# Patient Record
Sex: FEMALE | URBAN - METROPOLITAN AREA
[De-identification: names, ages, dates, MRNs, and addresses within clinical notes are randomized per-mention and may not be internally consistent; named-entity substitution may affect disease eponyms.]

---

## 2023-04-16 ENCOUNTER — HOSPITAL ENCOUNTER (OUTPATIENT)
Facility: HOSPITAL | Age: 38
Discharge: HOME | End: 2023-04-17
Attending: OBSTETRICS & GYNECOLOGY | Admitting: OBSTETRICS & GYNECOLOGY

## 2023-04-16 PROBLEM — Z33.1 IUP (INTRAUTERINE PREGNANCY), INCIDENTAL: Status: ACTIVE | Noted: 2023-04-16

## 2023-04-16 LAB
APPEARANCE UR: ABNORMAL
BILIRUB UR QL: NEGATIVE
COLOR UR: ABNORMAL
GLUCOSE UR QL STRIP.AUTO: NEGATIVE MG/DL
KETONES UR-MCNC: NEGATIVE MG/DL
LEUKOCYTE ESTERASE UR QL STRIP: ABNORMAL
NITRITE UR QL: POSITIVE
PH UR: 6.5 (ref 5–9)
PROT UR QL: 100 MG/DL
RBC # UR STRIP: ABNORMAL
SERVICE CMNT-IMP: ABNORMAL
SP GR UR: 1.01 (ref 1–1.02)
UROBILINOGEN UR QL: 0.2 EU/DL (ref 0.2–1)

## 2023-04-16 PROCEDURE — 81003 URINALYSIS AUTO W/O SCOPE: CPT

## 2023-04-17 VITALS — TEMPERATURE: 98.2 F | BODY MASS INDEX: 31.18 KG/M2 | HEIGHT: 63 IN | WEIGHT: 176 LBS

## 2023-04-17 PROBLEM — R31.9 URINARY TRACT INFECTION WITH HEMATURIA: Status: ACTIVE | Noted: 2023-04-17

## 2023-04-17 PROBLEM — N39.0 URINARY TRACT INFECTION WITH HEMATURIA: Status: ACTIVE | Noted: 2023-04-17

## 2023-04-17 LAB
APPEARANCE UR: ABNORMAL
BACTERIA URNS QL MICRO: ABNORMAL /HPF
BILIRUB UR QL: NEGATIVE
COLOR UR: ABNORMAL
EPITH CASTS URNS QL MICRO: ABNORMAL /LPF (ref 0–5)
GLUCOSE UR STRIP.AUTO-MCNC: NEGATIVE MG/DL
HGB UR QL STRIP: ABNORMAL
KETONES UR QL STRIP.AUTO: NEGATIVE MG/DL
LEUKOCYTE ESTERASE UR QL STRIP.AUTO: ABNORMAL
NITRITE UR QL STRIP.AUTO: POSITIVE
PH UR STRIP: 6.5 (ref 5–8)
PROT UR STRIP-MCNC: 100 MG/DL
RBC #/AREA URNS HPF: ABNORMAL /HPF (ref 0–5)
SERVICE CMNT-IMP: NORMAL
SP GR UR REFRACTOMETRY: 1 (ref 1–1.03)
UROBILINOGEN UR QL STRIP.AUTO: 1 EU/DL (ref 0.2–1)
WBC URNS QL MICRO: ABNORMAL /HPF (ref 0–5)
WET PREP GENITAL: NORMAL

## 2023-04-17 PROCEDURE — 87186 SC STD MICRODIL/AGAR DIL: CPT

## 2023-04-17 PROCEDURE — 87077 CULTURE AEROBIC IDENTIFY: CPT

## 2023-04-17 PROCEDURE — 59025 FETAL NON-STRESS TEST: CPT

## 2023-04-17 PROCEDURE — 81001 URINALYSIS AUTO W/SCOPE: CPT

## 2023-04-17 PROCEDURE — 87086 URINE CULTURE/COLONY COUNT: CPT

## 2023-04-17 PROCEDURE — 99285 EMERGENCY DEPT VISIT HI MDM: CPT

## 2023-04-17 PROCEDURE — 6370000000 HC RX 637 (ALT 250 FOR IP): Performed by: ADVANCED PRACTICE MIDWIFE

## 2023-04-17 PROCEDURE — 87210 SMEAR WET MOUNT SALINE/INK: CPT

## 2023-04-17 RX ORDER — NITROFURANTOIN 25; 75 MG/1; MG/1
100 CAPSULE ORAL EVERY 12 HOURS SCHEDULED
Qty: 13 CAPSULE | Refills: 0 | Status: SHIPPED | OUTPATIENT
Start: 2023-04-17 | End: 2023-04-24

## 2023-04-17 RX ORDER — NITROFURANTOIN 25; 75 MG/1; MG/1
100 CAPSULE ORAL EVERY 12 HOURS SCHEDULED
Qty: 13 CAPSULE | Refills: 0 | Status: CANCELLED | OUTPATIENT
Start: 2023-04-17 | End: 2023-04-24

## 2023-04-17 RX ORDER — NITROFURANTOIN 25; 75 MG/1; MG/1
100 CAPSULE ORAL EVERY 12 HOURS SCHEDULED
Status: DISCONTINUED | OUTPATIENT
Start: 2023-04-17 | End: 2023-04-17 | Stop reason: HOSPADM

## 2023-04-17 RX ADMIN — NITROFURANTOIN (MONOHYDRATE/MACROCRYSTALS) 100 MG: 75; 25 CAPSULE ORAL at 00:46

## 2023-04-17 NOTE — PROGRESS NOTES
2326-Pt arrived with complaints of UTI symptoms and also states she might have BV. Pt placed on the monitor. Pt is a   with all except first baby. 29 weeks. 0000-CNM at bedside wet prep cervix closed. 0034-Orders for macrobid. BV negative. 0045-Discharge instructions reviewed and macrobid given. 0049-This nurse walked patient down to exit.

## 2023-04-17 NOTE — PROGRESS NOTES
Triage Progress Note      Patient presented to triage at 29+0 with c/o urinary frequency and pain with vaginal discharge. She reports the symptoms started today. Physical Exam:  Cervical Exam: Internal os closed, external os, 1 cm, thick, -3   Membranes:  intact  Uterine Activity: occasional ctx lasting 50-60 seconds  Fetal Heart Rate: 135 bpm  Variability: moderate  Accelerations: appropriate for gestational age  Decelerations: absent     Assessment/Plan:  Urinary tract infection   +nitrites and blood on UA   Urine culture sent   Macrobid 100mg PO BID x 7 days    Vaginal discharge   Wet prep collected- negative for clue cells, trich, and yeast    Reassuring fetal status, no s/s of labor. Plan d/c home with office f/u. Patient to  prescription at the pharmacy.     Malu Holman CNM

## 2023-04-19 LAB
BACTERIA SPEC CULT: ABNORMAL
CC UR VC: ABNORMAL
SERVICE CMNT-IMP: ABNORMAL

## 2023-05-24 ENCOUNTER — HOSPITAL ENCOUNTER (OUTPATIENT)
Facility: HOSPITAL | Age: 38
Setting detail: OBSERVATION
Discharge: HOME | End: 2023-05-24
Attending: OBSTETRICS & GYNECOLOGY | Admitting: OBSTETRICS & GYNECOLOGY
Payer: MEDICAID

## 2023-05-24 VITALS
WEIGHT: 172 LBS | SYSTOLIC BLOOD PRESSURE: 114 MMHG | RESPIRATION RATE: 17 BRPM | BODY MASS INDEX: 30.48 KG/M2 | OXYGEN SATURATION: 99 % | HEART RATE: 100 BPM | HEIGHT: 63 IN | TEMPERATURE: 98.2 F | DIASTOLIC BLOOD PRESSURE: 68 MMHG

## 2023-05-24 PROBLEM — O26.893 VAGINAL DISCHARGE DURING PREGNANCY IN THIRD TRIMESTER: Status: ACTIVE | Noted: 2023-05-24

## 2023-05-24 PROBLEM — N89.8 VAGINAL DISCHARGE DURING PREGNANCY IN THIRD TRIMESTER: Status: ACTIVE | Noted: 2023-05-24

## 2023-05-24 LAB
AMNIOTEST, POC: NORMAL
Lab: NORMAL
NEGATIVE QC PASS/FAIL: NORMAL
POSITIVE QC PASS/FAIL: NORMAL

## 2023-05-24 PROCEDURE — G0378 HOSPITAL OBSERVATION PER HR: HCPCS

## 2023-05-24 PROCEDURE — 59025 FETAL NON-STRESS TEST: CPT

## 2023-05-24 PROCEDURE — 99283 EMERGENCY DEPT VISIT LOW MDM: CPT

## 2023-05-24 RX ORDER — VALACYCLOVIR HYDROCHLORIDE 500 MG/1
500 TABLET, FILM COATED ORAL DAILY
COMMUNITY

## 2023-05-24 NOTE — PROGRESS NOTES
Discharge instructions gone over with patient, all questions and concerns addressed prior to discharge. Pt ambulated off unit.

## 2023-05-24 NOTE — PROGRESS NOTES
Pt arrived to  with complaints of LOF since  night. Pt stated she felt a gush at night and hasn't had any since. CNM aware, orders placed.  34.3w    Amnisure negative, CNM aware and does not want to do a speculum exam. CNM reviewed EFM tracing and received discharge order.

## 2023-07-02 ENCOUNTER — HOSPITAL ENCOUNTER (INPATIENT)
Facility: HOSPITAL | Age: 38
LOS: 1 days | Discharge: HOME | DRG: 560 | End: 2023-07-03
Attending: OBSTETRICS & GYNECOLOGY | Admitting: OBSTETRICS & GYNECOLOGY
Payer: MEDICAID

## 2023-07-02 ENCOUNTER — ANESTHESIA EVENT (OUTPATIENT)
Facility: HOSPITAL | Age: 38
DRG: 560 | End: 2023-07-02
Payer: MEDICAID

## 2023-07-02 ENCOUNTER — ANESTHESIA (OUTPATIENT)
Facility: HOSPITAL | Age: 38
DRG: 560 | End: 2023-07-02
Payer: MEDICAID

## 2023-07-02 PROBLEM — Z37.9 NORMAL LABOR: Status: ACTIVE | Noted: 2023-07-02

## 2023-07-02 LAB
ABO + RH BLD: NORMAL
BASOPHILS # BLD: 0 K/UL (ref 0–0.1)
BASOPHILS NFR BLD: 0 % (ref 0–2)
BLOOD GROUP ANTIBODIES SERPL: NORMAL
DIFFERENTIAL METHOD BLD: ABNORMAL
EOSINOPHIL # BLD: 0.1 K/UL (ref 0–0.4)
EOSINOPHIL NFR BLD: 1 % (ref 0–5)
ERYTHROCYTE [DISTWIDTH] IN BLOOD BY AUTOMATED COUNT: 15.4 % (ref 11.6–14.5)
HCT VFR BLD AUTO: 33.9 % (ref 35–45)
HGB BLD-MCNC: 10.5 G/DL (ref 12–16)
IMM GRANULOCYTES # BLD AUTO: 0.1 K/UL (ref 0–0.04)
IMM GRANULOCYTES NFR BLD AUTO: 1 % (ref 0–0.5)
LYMPHOCYTES # BLD: 3.1 K/UL (ref 0.9–3.6)
LYMPHOCYTES NFR BLD: 27 % (ref 21–52)
MCH RBC QN AUTO: 26.5 PG (ref 24–34)
MCHC RBC AUTO-ENTMCNC: 31 G/DL (ref 31–37)
MCV RBC AUTO: 85.6 FL (ref 78–100)
MONOCYTES # BLD: 1.2 K/UL (ref 0.05–1.2)
MONOCYTES NFR BLD: 10 % (ref 3–10)
NEUTS SEG # BLD: 6.9 K/UL (ref 1.8–8)
NEUTS SEG NFR BLD: 61 % (ref 40–73)
NRBC # BLD: 0 K/UL (ref 0–0.01)
NRBC BLD-RTO: 0 PER 100 WBC
PLATELET # BLD AUTO: 311 K/UL (ref 135–420)
PMV BLD AUTO: 12 FL (ref 9.2–11.8)
RBC # BLD AUTO: 3.96 M/UL (ref 4.2–5.3)
SPECIMEN EXP DATE BLD: NORMAL
WBC # BLD AUTO: 11.3 K/UL (ref 4.6–13.2)

## 2023-07-02 PROCEDURE — 6370000000 HC RX 637 (ALT 250 FOR IP)

## 2023-07-02 PROCEDURE — 2500000003 HC RX 250 WO HCPCS: Performed by: NURSE ANESTHETIST, CERTIFIED REGISTERED

## 2023-07-02 PROCEDURE — 85025 COMPLETE CBC W/AUTO DIFF WBC: CPT

## 2023-07-02 PROCEDURE — 86900 BLOOD TYPING SEROLOGIC ABO: CPT

## 2023-07-02 PROCEDURE — 10907ZC DRAINAGE OF AMNIOTIC FLUID, THERAPEUTIC FROM PRODUCTS OF CONCEPTION, VIA NATURAL OR ARTIFICIAL OPENING: ICD-10-PCS | Performed by: OBSTETRICS & GYNECOLOGY

## 2023-07-02 PROCEDURE — 3700000025 EPIDURAL BLOCK: Performed by: ANESTHESIOLOGY

## 2023-07-02 PROCEDURE — 3700000156 HC EPIDURAL ANESTHESIA

## 2023-07-02 PROCEDURE — 59025 FETAL NON-STRESS TEST: CPT

## 2023-07-02 PROCEDURE — 1100000000 HC RM PRIVATE

## 2023-07-02 PROCEDURE — 6360000002 HC RX W HCPCS: Performed by: NURSE ANESTHETIST, CERTIFIED REGISTERED

## 2023-07-02 PROCEDURE — 99282 EMERGENCY DEPT VISIT SF MDM: CPT

## 2023-07-02 PROCEDURE — 4A1HXCZ MONITORING OF PRODUCTS OF CONCEPTION, CARDIAC RATE, EXTERNAL APPROACH: ICD-10-PCS | Performed by: OBSTETRICS & GYNECOLOGY

## 2023-07-02 PROCEDURE — 00HU33Z INSERTION OF INFUSION DEVICE INTO SPINAL CANAL, PERCUTANEOUS APPROACH: ICD-10-PCS | Performed by: ANESTHESIOLOGY

## 2023-07-02 PROCEDURE — 2580000003 HC RX 258: Performed by: NURSE ANESTHETIST, CERTIFIED REGISTERED

## 2023-07-02 PROCEDURE — 7220000101 HC DELIVERY VAGINAL/SINGLE

## 2023-07-02 PROCEDURE — 86850 RBC ANTIBODY SCREEN: CPT

## 2023-07-02 PROCEDURE — 86901 BLOOD TYPING SEROLOGIC RH(D): CPT

## 2023-07-02 PROCEDURE — 7210000100 HC LABOR FEE PER 1 HR

## 2023-07-02 RX ORDER — SODIUM CHLORIDE, SODIUM LACTATE, POTASSIUM CHLORIDE, AND CALCIUM CHLORIDE .6; .31; .03; .02 G/100ML; G/100ML; G/100ML; G/100ML
1000 INJECTION, SOLUTION INTRAVENOUS PRN
Status: DISCONTINUED | OUTPATIENT
Start: 2023-07-02 | End: 2023-07-02 | Stop reason: HOSPADM

## 2023-07-02 RX ORDER — CARBOPROST TROMETHAMINE 250 UG/ML
250 INJECTION, SOLUTION INTRAMUSCULAR PRN
Status: DISCONTINUED | OUTPATIENT
Start: 2023-07-02 | End: 2023-07-02 | Stop reason: HOSPADM

## 2023-07-02 RX ORDER — CARBOPROST TROMETHAMINE 250 UG/ML
250 INJECTION, SOLUTION INTRAMUSCULAR PRN
Status: DISCONTINUED | OUTPATIENT
Start: 2023-07-02 | End: 2023-07-03 | Stop reason: HOSPADM

## 2023-07-02 RX ORDER — LIDOCAINE HYDROCHLORIDE AND EPINEPHRINE 20; 5 MG/ML; UG/ML
INJECTION, SOLUTION EPIDURAL; INFILTRATION; INTRACAUDAL; PERINEURAL PRN
Status: DISCONTINUED | OUTPATIENT
Start: 2023-07-02 | End: 2023-07-02 | Stop reason: SDUPTHER

## 2023-07-02 RX ORDER — ROPIVACAINE HYDROCHLORIDE 2 MG/ML
INJECTION, SOLUTION EPIDURAL; INFILTRATION; PERINEURAL PRN
Status: DISCONTINUED | OUTPATIENT
Start: 2023-07-02 | End: 2023-07-02 | Stop reason: SDUPTHER

## 2023-07-02 RX ORDER — SODIUM CHLORIDE 9 MG/ML
INJECTION, SOLUTION INTRAVENOUS PRN
Status: DISCONTINUED | OUTPATIENT
Start: 2023-07-02 | End: 2023-07-03 | Stop reason: HOSPADM

## 2023-07-02 RX ORDER — ACETAMINOPHEN 500 MG
1000 TABLET ORAL EVERY 8 HOURS PRN
Status: DISCONTINUED | OUTPATIENT
Start: 2023-07-02 | End: 2023-07-03 | Stop reason: HOSPADM

## 2023-07-02 RX ORDER — LIDOCAINE HYDROCHLORIDE AND EPINEPHRINE 20; 5 MG/ML; UG/ML
INJECTION, SOLUTION EPIDURAL; INFILTRATION; INTRACAUDAL; PERINEURAL PRN
Status: DISCONTINUED | OUTPATIENT
Start: 2023-07-02 | End: 2023-07-02

## 2023-07-02 RX ORDER — ACETAMINOPHEN 325 MG/1
650 TABLET ORAL EVERY 4 HOURS PRN
Status: DISCONTINUED | OUTPATIENT
Start: 2023-07-02 | End: 2023-07-02 | Stop reason: HOSPADM

## 2023-07-02 RX ORDER — METHYLERGONOVINE MALEATE 0.2 MG/ML
200 INJECTION INTRAVENOUS PRN
Status: DISCONTINUED | OUTPATIENT
Start: 2023-07-02 | End: 2023-07-02 | Stop reason: HOSPADM

## 2023-07-02 RX ORDER — MAGNESIUM CARB/ALUMINUM HYDROX 105-160MG
30 TABLET,CHEWABLE ORAL PRN
Status: DISCONTINUED | OUTPATIENT
Start: 2023-07-02 | End: 2023-07-03 | Stop reason: HOSPADM

## 2023-07-02 RX ORDER — LIDOCAINE HYDROCHLORIDE 10 MG/ML
30 INJECTION, SOLUTION EPIDURAL; INFILTRATION; INTRACAUDAL; PERINEURAL PRN
Status: DISCONTINUED | OUTPATIENT
Start: 2023-07-02 | End: 2023-07-03 | Stop reason: HOSPADM

## 2023-07-02 RX ORDER — TRANEXAMIC ACID 10 MG/ML
1000 INJECTION, SOLUTION INTRAVENOUS
Status: DISCONTINUED | OUTPATIENT
Start: 2023-07-02 | End: 2023-07-03 | Stop reason: HOSPADM

## 2023-07-02 RX ORDER — DOCUSATE SODIUM 100 MG/1
100 CAPSULE, LIQUID FILLED ORAL 2 TIMES DAILY PRN
Status: DISCONTINUED | OUTPATIENT
Start: 2023-07-02 | End: 2023-07-03 | Stop reason: HOSPADM

## 2023-07-02 RX ORDER — ONDANSETRON 2 MG/ML
4 INJECTION INTRAMUSCULAR; INTRAVENOUS EVERY 6 HOURS PRN
Status: DISCONTINUED | OUTPATIENT
Start: 2023-07-02 | End: 2023-07-03 | Stop reason: HOSPADM

## 2023-07-02 RX ORDER — IBUPROFEN 400 MG/1
800 TABLET ORAL EVERY 8 HOURS PRN
Status: DISCONTINUED | OUTPATIENT
Start: 2023-07-02 | End: 2023-07-03 | Stop reason: HOSPADM

## 2023-07-02 RX ORDER — MISOPROSTOL 200 UG/1
800 TABLET ORAL PRN
Status: DISCONTINUED | OUTPATIENT
Start: 2023-07-02 | End: 2023-07-02 | Stop reason: HOSPADM

## 2023-07-02 RX ORDER — SENNA AND DOCUSATE SODIUM 50; 8.6 MG/1; MG/1
1 TABLET, FILM COATED ORAL DAILY PRN
Status: DISCONTINUED | OUTPATIENT
Start: 2023-07-02 | End: 2023-07-03 | Stop reason: HOSPADM

## 2023-07-02 RX ORDER — ONDANSETRON 2 MG/ML
4 INJECTION INTRAMUSCULAR; INTRAVENOUS EVERY 6 HOURS PRN
Status: DISCONTINUED | OUTPATIENT
Start: 2023-07-02 | End: 2023-07-02 | Stop reason: HOSPADM

## 2023-07-02 RX ORDER — METHYLERGONOVINE MALEATE 0.2 MG/ML
200 INJECTION INTRAVENOUS PRN
Status: DISCONTINUED | OUTPATIENT
Start: 2023-07-02 | End: 2023-07-03 | Stop reason: HOSPADM

## 2023-07-02 RX ORDER — FERROUS SULFATE 325(65) MG
325 TABLET ORAL
Status: DISCONTINUED | OUTPATIENT
Start: 2023-07-03 | End: 2023-07-03 | Stop reason: HOSPADM

## 2023-07-02 RX ORDER — MISOPROSTOL 200 UG/1
600 TABLET ORAL PRN
Status: DISCONTINUED | OUTPATIENT
Start: 2023-07-02 | End: 2023-07-03 | Stop reason: HOSPADM

## 2023-07-02 RX ORDER — SODIUM CHLORIDE 0.9 % (FLUSH) 0.9 %
5-40 SYRINGE (ML) INJECTION EVERY 12 HOURS SCHEDULED
Status: DISCONTINUED | OUTPATIENT
Start: 2023-07-02 | End: 2023-07-03 | Stop reason: HOSPADM

## 2023-07-02 RX ORDER — NALOXONE HYDROCHLORIDE 0.4 MG/ML
INJECTION, SOLUTION INTRAMUSCULAR; INTRAVENOUS; SUBCUTANEOUS PRN
Status: DISCONTINUED | OUTPATIENT
Start: 2023-07-02 | End: 2023-07-02 | Stop reason: HOSPADM

## 2023-07-02 RX ORDER — NALBUPHINE HYDROCHLORIDE 10 MG/ML
10 INJECTION, SOLUTION INTRAMUSCULAR; INTRAVENOUS; SUBCUTANEOUS
Status: DISCONTINUED | OUTPATIENT
Start: 2023-07-02 | End: 2023-07-02 | Stop reason: HOSPADM

## 2023-07-02 RX ORDER — MISOPROSTOL 200 UG/1
800 TABLET ORAL PRN
Status: DISCONTINUED | OUTPATIENT
Start: 2023-07-02 | End: 2023-07-03 | Stop reason: HOSPADM

## 2023-07-02 RX ORDER — SIMETHICONE 80 MG
80 TABLET,CHEWABLE ORAL EVERY 6 HOURS PRN
Status: DISCONTINUED | OUTPATIENT
Start: 2023-07-02 | End: 2023-07-03 | Stop reason: HOSPADM

## 2023-07-02 RX ORDER — TRANEXAMIC ACID 10 MG/ML
1000 INJECTION, SOLUTION INTRAVENOUS
Status: DISCONTINUED | OUTPATIENT
Start: 2023-07-02 | End: 2023-07-02 | Stop reason: HOSPADM

## 2023-07-02 RX ORDER — HYDROMORPHONE HYDROCHLORIDE 1 MG/ML
1 INJECTION, SOLUTION INTRAMUSCULAR; INTRAVENOUS; SUBCUTANEOUS
Status: DISCONTINUED | OUTPATIENT
Start: 2023-07-02 | End: 2023-07-03 | Stop reason: HOSPADM

## 2023-07-02 RX ORDER — SODIUM CHLORIDE 0.9 % (FLUSH) 0.9 %
5-40 SYRINGE (ML) INJECTION PRN
Status: DISCONTINUED | OUTPATIENT
Start: 2023-07-02 | End: 2023-07-03 | Stop reason: HOSPADM

## 2023-07-02 RX ORDER — LANOLIN/MINERAL OIL
LOTION (ML) TOPICAL PRN
Status: DISCONTINUED | OUTPATIENT
Start: 2023-07-02 | End: 2023-07-03 | Stop reason: HOSPADM

## 2023-07-02 RX ORDER — SODIUM CHLORIDE, SODIUM LACTATE, POTASSIUM CHLORIDE, AND CALCIUM CHLORIDE .6; .31; .03; .02 G/100ML; G/100ML; G/100ML; G/100ML
500 INJECTION, SOLUTION INTRAVENOUS PRN
Status: DISCONTINUED | OUTPATIENT
Start: 2023-07-02 | End: 2023-07-02 | Stop reason: HOSPADM

## 2023-07-02 RX ORDER — SODIUM CHLORIDE, SODIUM LACTATE, POTASSIUM CHLORIDE, CALCIUM CHLORIDE 600; 310; 30; 20 MG/100ML; MG/100ML; MG/100ML; MG/100ML
INJECTION, SOLUTION INTRAVENOUS CONTINUOUS
Status: DISCONTINUED | OUTPATIENT
Start: 2023-07-02 | End: 2023-07-02 | Stop reason: HOSPADM

## 2023-07-02 RX ORDER — LIDOCAINE HYDROCHLORIDE 10 MG/ML
INJECTION, SOLUTION INFILTRATION; PERINEURAL PRN
Status: DISCONTINUED | OUTPATIENT
Start: 2023-07-02 | End: 2023-07-02 | Stop reason: SDUPTHER

## 2023-07-02 RX ADMIN — LIDOCAINE HYDROCHLORIDE 3 MG: 10 INJECTION, SOLUTION INFILTRATION; PERINEURAL at 06:49

## 2023-07-02 RX ADMIN — IBUPROFEN 800 MG: 400 TABLET, FILM COATED ORAL at 21:53

## 2023-07-02 RX ADMIN — ROPIVACAINE HYDROCHLORIDE 6 ML: 2 INJECTION, SOLUTION EPIDURAL; INFILTRATION at 06:59

## 2023-07-02 RX ADMIN — LIDOCAINE HYDROCHLORIDE,EPINEPHRINE BITARTRATE 6 ML: 20; .005 INJECTION, SOLUTION EPIDURAL; INFILTRATION; INTRACAUDAL; PERINEURAL at 06:53

## 2023-07-02 RX ADMIN — FENTANYL CITRATE 10 ML/HR: 0.05 INJECTION, SOLUTION INTRAMUSCULAR; INTRAVENOUS at 07:07

## 2023-07-02 ASSESSMENT — PAIN SCALES - GENERAL: PAINLEVEL_OUTOF10: 6

## 2023-07-02 ASSESSMENT — PAIN DESCRIPTION - DESCRIPTORS: DESCRIPTORS: CRAMPING

## 2023-07-02 ASSESSMENT — PAIN DESCRIPTION - LOCATION: LOCATION: ABDOMEN

## 2023-07-02 ASSESSMENT — PAIN - FUNCTIONAL ASSESSMENT: PAIN_FUNCTIONAL_ASSESSMENT: ACTIVITIES ARE NOT PREVENTED

## 2023-07-03 VITALS
OXYGEN SATURATION: 100 % | HEART RATE: 70 BPM | SYSTOLIC BLOOD PRESSURE: 115 MMHG | WEIGHT: 184 LBS | TEMPERATURE: 98.1 F | RESPIRATION RATE: 17 BRPM | HEIGHT: 63 IN | BODY MASS INDEX: 32.6 KG/M2 | DIASTOLIC BLOOD PRESSURE: 63 MMHG

## 2023-07-03 LAB
ERYTHROCYTE [DISTWIDTH] IN BLOOD BY AUTOMATED COUNT: 15.9 % (ref 11.6–14.5)
HCT VFR BLD AUTO: 30.2 % (ref 35–45)
HGB BLD-MCNC: 9.3 G/DL (ref 12–16)
MCH RBC QN AUTO: 27 PG (ref 24–34)
MCHC RBC AUTO-ENTMCNC: 30.8 G/DL (ref 31–37)
MCV RBC AUTO: 87.5 FL (ref 78–100)
NRBC # BLD: 0 K/UL (ref 0–0.01)
NRBC BLD-RTO: 0 PER 100 WBC
PLATELET # BLD AUTO: 284 K/UL (ref 135–420)
PMV BLD AUTO: 12.4 FL (ref 9.2–11.8)
RBC # BLD AUTO: 3.45 M/UL (ref 4.2–5.3)
WBC # BLD AUTO: 11.8 K/UL (ref 4.6–13.2)

## 2023-07-03 PROCEDURE — 6370000000 HC RX 637 (ALT 250 FOR IP)

## 2023-07-03 PROCEDURE — 85027 COMPLETE CBC AUTOMATED: CPT

## 2023-07-03 PROCEDURE — 36415 COLL VENOUS BLD VENIPUNCTURE: CPT

## 2023-07-03 RX ORDER — IBUPROFEN 800 MG/1
800 TABLET ORAL EVERY 8 HOURS PRN
Qty: 90 TABLET | Refills: 1 | Status: SHIPPED | OUTPATIENT
Start: 2023-07-03

## 2023-07-03 RX ADMIN — IBUPROFEN 800 MG: 400 TABLET, FILM COATED ORAL at 06:49

## 2023-07-03 RX ADMIN — FERROUS SULFATE TAB 325 MG (65 MG ELEMENTAL FE) 325 MG: 325 (65 FE) TAB at 08:39

## 2023-07-03 ASSESSMENT — PAIN DESCRIPTION - LOCATION: LOCATION: ABDOMEN

## 2023-07-03 ASSESSMENT — PAIN DESCRIPTION - DESCRIPTORS: DESCRIPTORS: CRAMPING;SORE

## 2023-07-03 ASSESSMENT — PAIN SCALES - GENERAL
PAINLEVEL_OUTOF10: 5
PAINLEVEL_OUTOF10: 4

## 2023-07-03 NOTE — DISCHARGE SUMMARY
Obstetrical Discharge Summary     Name: Fuad Boyce MRN: [id number]  SSN: [id number]    YOB: 1985  Age: 43 y.o. Sex: female      Admit Date: 2023    Discharge Date: 7/3/2023    Admitting Physician: Josias Rinaldi MD     Attending Physician:  Vanessa Rodriguez MD     Discharge Diagnoses:   Sylvien Gretchen Boy Bartholome Ward [[id number]]       Information    Head delivery date/time: 2023 09:49:00   Changing the 's delivery date/time could affect patient care.:      Delivery date/time:  23 0949   Delivery type: , Spontaneous    Details: Additional Diagnoses:   Patient Active Problem List   Diagnosis    IUP (intrauterine pregnancy), incidental    Urinary tract infection with hematuria    Vaginal discharge during pregnancy in third trimester     (spontaneous vaginal delivery)     No components found for: Radha Rodriguez, [facility], [facility]  Recent Labs     23  0635   HGB 9.3*       Hospital Course: Normal hospital course following the delivery. Disposition: home    Discharge Condition: Good    Patient Instructions:   Current Discharge Medication List        START taking these medications    Details   ibuprofen (ADVIL;MOTRIN) 800 MG tablet Take 1 tablet by mouth every 8 hours as needed for Pain  Qty: 90 tablet, Refills: 1           CONTINUE these medications which have NOT CHANGED    Details   valACYclovir (VALTREX) 500 MG tablet Take 1 tablet by mouth daily              Reference my discharge instructions.   Follow up with OB provider in 6 weeks      Signed By:  TOMMY Clarke CNM     July 3, 2023

## 2023-07-03 NOTE — DISCHARGE INSTRUCTIONS
serious risk to your health. Obesity, smoking, and sedentary lifestyle greatly increases your risk for illness    A healthy diet, regular physical exercise & weight monitoring are important for maintaining a healthy lifestyle    Recognize signs and symptoms of STROKE:    F-face looks uneven    A-arms unable to move or move unevenly    S-speech slurred or non-existent    T-time-call 911 as soon as signs and symptoms begin-DO NOT go       Back to bed or wait to see if you get better-TIME IS BRAIN. Patient armband removed and given to patient to take home.   Patient was informed of the privacy risks if armband lost or stolen

## 2023-07-03 NOTE — LACTATION NOTE
This note was copied from a baby's chart. 23 0945   Visit Information   Lactation Consult Visit Type IP Initial Consult   Visit Length 15 minutes   Referral Received From Referred by MD   Reason for Visit Education;Normal [place] Visit   Breast Feeding History/Assessment   Breastfeeding History Yes   Longest duration (#) 12   Longest Duration months   Complications No   Feeding Assessment: Maternal Factors   Maternal Response Change to bottle   Feeding Assessment: Infant Factors   Infant Supplementation Expressed Breast Milk; Formula     Mom educated on breastfeeding basics--hunger cues, feeding on demand, waking baby if baby sleeps too long between feeds, importance of skin to skin, positioning and latching, risk of pacifier use and supplemental feedings, and importance of rooming in--and use of log sheet. Mom also educated on benefits of breastfeeding for herself and baby. Mom verbalized understanding. No questions at this time. Mom feeding a bottle at this time. Mom stated \"I want to exclusively pump, not breastfeed\". Discussed pumping expectations and milk safety. Mom verbalized understanding. Will remain available.

## 2023-07-03 NOTE — PROGRESS NOTES
Post-Partum Day Number 1 Progress Note    Aleks Abler     Assessment:   Patient Active Problem List   Diagnosis    IUP (intrauterine pregnancy), incidental    Urinary tract infection with hematuria    Vaginal discharge during pregnancy in third trimester    Normal labor     Doing well, post partum day 1    Plan:  1. Continue routine postpartum and perineal care as well as maternal education. 2. Discharge home today  3. Follow up with OB provider in 6 weeks    Information for the patient's :  Ambar Arzola [[id number]]   [address] [facility],[address], Spontaneous  Patient doing well without significant complaint. Voiding without difficulty, normal lochia.     Current Facility-Administered Medications   Medication Dose Route Frequency Provider Last Rate Last Admin    oxytocin (PITOCIN) 30 units in 500 mL infusion  87.3 ulisses-units/min IntraVENous Continuous PRN Jennetta Route, APRN - CNM        And    oxytocin (PITOCIN) 10 unit bolus from the bag  10 Units IntraVENous PRN Jennetta Route, APRN - CNM        HYDROmorphone HCl PF (DILAUDID) injection 1 mg  1 mg IntraVENous Q3H PRN Jennetta Route, APRN - CNM        witch hazel-glycerin (TUCKS) pad   Topical PRN Jennetta Route, APRN - CNM        mineral oil liquid 30 mL  30 mL Topical PRN Jennetta Route, APRN - CNM        lidocaine PF 1 % injection 30 mL  30 mL Other PRN Jennetta Route, APRN - CNM        sodium chloride flush 0.9 % injection 5-40 mL  5-40 mL IntraVENous 2 times per day Karlis Bristle, APRN - CNM        sodium chloride flush 0.9 % injection 5-40 mL  5-40 mL IntraVENous PRN Karlis Bristle, APRN - CNM        0.9 % sodium chloride infusion   IntraVENous PRN Romeo [place] Coxson, APRN - CNM        carboprost (HEMABATE) injection 250 mcg  250 mcg IntraMUSCular PRN Romeo [place] Coxson, APRN - CNM        methylergonovine (METHERGINE) injection 200 mcg  200 mcg IntraMUSCular PRN Romeo [place] Coxson, APRN - CNM        miSOPROStol (CYTOTEC) tablet 800 mcg  800 mcg

## 2023-08-15 ENCOUNTER — HOSPITAL ENCOUNTER (EMERGENCY)
Facility: HOSPITAL | Age: 38
Discharge: HOME | End: 2023-08-16
Attending: EMERGENCY MEDICINE
Payer: MEDICAID

## 2023-08-15 VITALS
OXYGEN SATURATION: 98 % | HEART RATE: 70 BPM | BODY MASS INDEX: 28.7 KG/M2 | DIASTOLIC BLOOD PRESSURE: 81 MMHG | WEIGHT: 162 LBS | TEMPERATURE: 97.6 F | HEIGHT: 63 IN | SYSTOLIC BLOOD PRESSURE: 119 MMHG | RESPIRATION RATE: 16 BRPM

## 2023-08-15 DIAGNOSIS — H60.502 ACUTE OTITIS EXTERNA OF LEFT EAR, UNSPECIFIED TYPE: Primary | ICD-10-CM

## 2023-08-15 DIAGNOSIS — R42 DIZZINESS: ICD-10-CM

## 2023-08-15 DIAGNOSIS — H66.002 NON-RECURRENT ACUTE SUPPURATIVE OTITIS MEDIA OF LEFT EAR WITHOUT SPONTANEOUS RUPTURE OF TYMPANIC MEMBRANE: ICD-10-CM

## 2023-08-15 LAB
ALBUMIN SERPL-MCNC: 3.6 G/DL (ref 3.4–5)
ALBUMIN/GLOB SERPL: 0.8 (ref 0.8–1.7)
ALP SERPL-CCNC: 122 U/L (ref 45–117)
ALT SERPL-CCNC: 34 U/L (ref 13–56)
ANION GAP SERPL CALC-SCNC: 4 MMOL/L (ref 3–18)
APPEARANCE UR: CLEAR
AST SERPL-CCNC: 14 U/L (ref 10–38)
BACTERIA URNS QL MICRO: ABNORMAL /HPF
BASOPHILS # BLD: 0 K/UL (ref 0–0.1)
BASOPHILS NFR BLD: 0 % (ref 0–2)
BILIRUB SERPL-MCNC: 0.2 MG/DL (ref 0.2–1)
BILIRUB UR QL: NEGATIVE
BUN SERPL-MCNC: 13 MG/DL (ref 7–18)
BUN/CREAT SERPL: 15 (ref 12–20)
CALCIUM SERPL-MCNC: 9.1 MG/DL (ref 8.5–10.1)
CHLORIDE SERPL-SCNC: 107 MMOL/L (ref 100–111)
CK SERPL-CCNC: 58 U/L (ref 26–192)
CO2 SERPL-SCNC: 28 MMOL/L (ref 21–32)
COLOR UR: YELLOW
CREAT SERPL-MCNC: 0.89 MG/DL (ref 0.6–1.3)
DIFFERENTIAL METHOD BLD: ABNORMAL
EOSINOPHIL # BLD: 0.2 K/UL (ref 0–0.4)
EOSINOPHIL NFR BLD: 2 % (ref 0–5)
EPITH CASTS URNS QL MICRO: ABNORMAL /LPF (ref 0–5)
ERYTHROCYTE [DISTWIDTH] IN BLOOD BY AUTOMATED COUNT: 18.4 % (ref 11.6–14.5)
GLOBULIN SER CALC-MCNC: 4.6 G/DL (ref 2–4)
GLUCOSE SERPL-MCNC: 85 MG/DL (ref 74–99)
GLUCOSE UR STRIP.AUTO-MCNC: NEGATIVE MG/DL
HCT VFR BLD AUTO: 41.3 % (ref 35–45)
HGB BLD-MCNC: 12.8 G/DL (ref 12–16)
HGB UR QL STRIP: NEGATIVE
IMM GRANULOCYTES # BLD AUTO: 0 K/UL (ref 0–0.04)
IMM GRANULOCYTES NFR BLD AUTO: 1 % (ref 0–0.5)
KETONES UR QL STRIP.AUTO: NEGATIVE MG/DL
LEUKOCYTE ESTERASE UR QL STRIP.AUTO: ABNORMAL
LYMPHOCYTES # BLD: 3.6 K/UL (ref 0.9–3.6)
LYMPHOCYTES NFR BLD: 48 % (ref 21–52)
MAGNESIUM SERPL-MCNC: 2.5 MG/DL (ref 1.6–2.6)
MCH RBC QN AUTO: 27.4 PG (ref 24–34)
MCHC RBC AUTO-ENTMCNC: 31 G/DL (ref 31–37)
MCV RBC AUTO: 88.2 FL (ref 78–100)
MONOCYTES # BLD: 0.8 K/UL (ref 0.05–1.2)
MONOCYTES NFR BLD: 10 % (ref 3–10)
NEUTS SEG # BLD: 3 K/UL (ref 1.8–8)
NEUTS SEG NFR BLD: 39 % (ref 40–73)
NITRITE UR QL STRIP.AUTO: NEGATIVE
NRBC # BLD: 0 K/UL (ref 0–0.01)
NRBC BLD-RTO: 0 PER 100 WBC
PH UR STRIP: 7 (ref 5–8)
PLATELET # BLD AUTO: 450 K/UL (ref 135–420)
PMV BLD AUTO: 11.1 FL (ref 9.2–11.8)
POTASSIUM SERPL-SCNC: 4.6 MMOL/L (ref 3.5–5.5)
PROT SERPL-MCNC: 8.2 G/DL (ref 6.4–8.2)
PROT UR STRIP-MCNC: NEGATIVE MG/DL
RBC # BLD AUTO: 4.68 M/UL (ref 4.2–5.3)
RBC #/AREA URNS HPF: ABNORMAL /HPF (ref 0–5)
SODIUM SERPL-SCNC: 139 MMOL/L (ref 136–145)
SP GR UR REFRACTOMETRY: 1.02 (ref 1–1.03)
UROBILINOGEN UR QL STRIP.AUTO: 1 EU/DL (ref 0.2–1)
WBC # BLD AUTO: 7.6 K/UL (ref 4.6–13.2)
WBC URNS QL MICRO: ABNORMAL /HPF (ref 0–5)

## 2023-08-15 PROCEDURE — 83735 ASSAY OF MAGNESIUM: CPT

## 2023-08-15 PROCEDURE — 82550 ASSAY OF CK (CPK): CPT

## 2023-08-15 PROCEDURE — 80053 COMPREHEN METABOLIC PANEL: CPT

## 2023-08-15 PROCEDURE — 99284 EMERGENCY DEPT VISIT MOD MDM: CPT

## 2023-08-15 PROCEDURE — 2580000003 HC RX 258: Performed by: EMERGENCY MEDICINE

## 2023-08-15 PROCEDURE — 85025 COMPLETE CBC W/AUTO DIFF WBC: CPT

## 2023-08-15 PROCEDURE — 81001 URINALYSIS AUTO W/SCOPE: CPT

## 2023-08-15 RX ORDER — AMOXICILLIN 500 MG/1
500 CAPSULE ORAL 2 TIMES DAILY
Qty: 14 CAPSULE | Refills: 0 | Status: SHIPPED | OUTPATIENT
Start: 2023-08-15 | End: 2023-08-22

## 2023-08-15 RX ORDER — 0.9 % SODIUM CHLORIDE 0.9 %
1000 INTRAVENOUS SOLUTION INTRAVENOUS ONCE
Status: COMPLETED | OUTPATIENT
Start: 2023-08-15 | End: 2023-08-15

## 2023-08-15 RX ORDER — AMOXICILLIN 250 MG/1
500 CAPSULE ORAL ONCE
Status: COMPLETED | OUTPATIENT
Start: 2023-08-16 | End: 2023-08-16

## 2023-08-15 RX ORDER — CIPROFLOXACIN AND DEXAMETHASONE 3; 1 MG/ML; MG/ML
4 SUSPENSION/ DROPS AURICULAR (OTIC) 2 TIMES DAILY
Qty: 5 ML | Refills: 0 | Status: SHIPPED | OUTPATIENT
Start: 2023-08-15 | End: 2023-08-20

## 2023-08-15 RX ORDER — CIPROFLOXACIN AND FLUOCINOLONE ACETONIDE .75; .0625 MG/.25ML; MG/.25ML
0.25 SOLUTION AURICULAR (OTIC)
Status: COMPLETED | OUTPATIENT
Start: 2023-08-16 | End: 2023-08-16

## 2023-08-15 RX ADMIN — SODIUM CHLORIDE 1000 ML: 900 INJECTION, SOLUTION INTRAVENOUS at 21:01

## 2023-08-15 ASSESSMENT — PAIN - FUNCTIONAL ASSESSMENT: PAIN_FUNCTIONAL_ASSESSMENT: NONE - DENIES PAIN

## 2023-08-15 NOTE — ED TRIAGE NOTES
Pt arrived with c/o sudden onset of dizziness. Pt is 6 weeks postpartum. Pt states she was laying flat on the floor and turned to look at her baby and \"it felt like I was doing a double take\". Pt states she has not been able to get rid of the dizziness. Pt states she checked her BP and sugar and all were normal. Pt is alert and oriented x4. Vital signs are stable.

## 2023-08-16 PROCEDURE — 6370000000 HC RX 637 (ALT 250 FOR IP): Performed by: EMERGENCY MEDICINE

## 2023-08-16 RX ADMIN — AMOXICILLIN 500 MG: 250 CAPSULE ORAL at 00:03

## 2023-08-16 RX ADMIN — CIPROFLOXACIN AND FLUOCINOLONE ACETONIDE 0.25 ML: .75; .0625 SOLUTION AURICULAR (OTIC) at 00:03

## 2023-09-01 ENCOUNTER — APPOINTMENT (OUTPATIENT)
Facility: HOSPITAL | Age: 38
End: 2023-09-01
Payer: MEDICAID

## 2023-09-03 PROBLEM — M46.1 SACROILIITIS, NOT ELSEWHERE CLASSIFIED (HCC): Status: ACTIVE | Noted: 2019-08-15

## 2023-09-03 PROBLEM — F41.9 ANXIETY DISORDER, UNSPECIFIED: Status: ACTIVE | Noted: 2023-09-03

## 2023-09-03 PROBLEM — L71.9 ROSACEA, UNSPECIFIED: Status: ACTIVE | Noted: 2023-09-03

## 2023-09-03 PROBLEM — F32.89 OTHER SPECIFIED DEPRESSIVE EPISODES: Status: ACTIVE | Noted: 2023-09-03

## 2023-09-04 NOTE — ED PROVIDER NOTES
EMERGENCY DEPARTMENT HISTORY AND PHYSICAL EXAM    Date: 8/15/2023  Patient Name: Mag Lucero    History of Presenting Illness     Chief Complaint   Patient presents with    Dizziness         History Provided By: Patient    Additional History (Context):   8:34 PM EDT  Mag Lucero is a 43 y.o. female with PMHX of rosacea, sacroiliitis, depression anxiety, vitamin B deficiency who presents to the emergency department C/O dizziness. Patient presents 6 weeks postpartum with symptoms of dizziness lightheadedness. She states she has been having dizziness lightheadedness with started earlier in the day. There is no nausea vomiting arm leg weakness speech or visual problems. She reports taking blood pressure and blood sugar at home which were normal.  She also complains of soreness and fullness to her left ear. She has no drainage from her ear or sinuses. Social History  Denies smoking drinking or drugs    Family History  Negative for bleeding disorders blood pressure    PCP: None None    No current facility-administered medications for this encounter. Current Outpatient Medications   Medication Sig Dispense Refill    ibuprofen (ADVIL;MOTRIN) 800 MG tablet Take 1 tablet by mouth every 8 hours as needed for Pain 90 tablet 1    valACYclovir (VALTREX) 500 MG tablet Take 1 tablet by mouth daily         Past History     Past Medical History:  Past Medical History:   Diagnosis Date    Anemia     Herpes simplex virus (HSV) infection     Mental disorder     Vitamin B deficiency, unspecified        Past Surgical History:  Past Surgical History:   Procedure Laterality Date     SECTION      CHOLECYSTECTOMY         Family History:  No family history on file. Social History:  Social History     Tobacco Use    Smoking status: Never    Smokeless tobacco: Never   Substance Use Topics    Alcohol use: Not Currently    Drug use: Never       Allergies:   Allergies   Allergen Reactions    Clindamycin Hives

## 2023-09-08 ENCOUNTER — HOSPITAL ENCOUNTER (OUTPATIENT)
Facility: HOSPITAL | Age: 38
Setting detail: RECURRING SERIES
Discharge: HOME | End: 2023-09-11
Payer: MEDICAID

## 2023-09-08 PROCEDURE — 97162 PT EVAL MOD COMPLEX 30 MIN: CPT

## 2023-09-08 NOTE — PROGRESS NOTES
Physical Therapy Evaluation      Patient Name: Leticia Torres    Date: 2023    : 1985  Insurance: Payor: Eddie Archuleta / Plan: Eddie Archuleta / Product Type: *No Product type* /      Patient  verified yes     Visit #   Current / Total 1 12   Time   In / Out 831 910   Pain   In / Out 5 5   Subjective Functional Status/Changes: See eval   Changes to:  Meds, Allergies, Med Hx, Sx Hx? If yes, update Summary List See eval     TREATMENT AREA =  Other symptoms and signs involving the genitourinary system [R39.89]    SUBJECTIVE  Pain Level (0-10 scale): 0  []constant []intermittent []improving []worsening []no change since onset    Any medication changes, allergies to medications, adverse drug reactions, diagnosis change, or new procedure performed?: [x] No    [] Yes (see summary sheet for update)  Subjective functional status/changes:     PLOF: Prior to kids no limitations, more issues after 3rd child  Limitations to PLOF: Having pain on stairs especially holding baby, pain with laying in bed and transfers out of bed, when she stands too long has pressure/ heaviness, carrying/ lifting (does not lift 1year old 36#). Back and pelvic pain  Mechanism of Injury: 5 pregnancies  Current symptoms/Complaints: Pt had her baby son in July, felt like her bladder was coming out, went to OBGYN at 8 weeks and they confirmed a prolapse. Feeling better. She has a hx of pelvic trauma from a fall, went through PT for that. Had a baby in 2020 (vaginal) then got pregnant again 6 months post partum - very difficult pregnancy.    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 This morning he is resting comfortably in bed and reports no new issues or overnight events.     PAST MEDICAL & SURGICAL HISTORY  Syncope  Peripheral Neuropathy  S/P Implantation of Automatic  Personal History of Multiple S  Personal History of Mitral Nicki  Personal History of MI (Myocar  Personal History of Congestive  Personal History of Cardiomyop  Personal History of Atrial Fib  Personal History of Alcoholism  Clinical Depression  S/P Orchiectomy: L for testicular CA  SVT (Supraventricular Tachycar  GERD (Gastroesophageal Reflux  S/P Implantation of AICD    SOCIAL HISTORY:  Negative for smoking/alcohol/drug use.     ALLERGIES:  dexmethylphenidate (Unknown)  Dilantin, compazine, neurontin, ridaline, phenegrin (Anaphylaxis)  dilantin, compazine, neurontin, ritalin, phenergan (Unknown)  Haldol (Unknown)  hydantoin derivatives (Other)  methylphenidate (Unknown)  Morphine Sulfate (Unknown)  phenytoin (Unknown)  prochlorperazine (Unknown)  thioxanthenes (Unknown)    MEDICATIONS:  STANDING MEDICATIONS  adenosine Injectable (ADENOSCAN) 60 milliGRAM(s) IV Bolus once  apixaban 5 milliGRAM(s) Oral every 12 hours  aspirin  chewable 81 milliGRAM(s) Oral daily  atorvastatin 40 milliGRAM(s) Oral at bedtime  baclofen 10 milliGRAM(s) Oral three times a day  docusate sodium 100 milliGRAM(s) Oral three times a day  dofetilide 250 MICROGram(s) Oral two times a day  dronabinol 5 milliGRAM(s) Oral daily  furosemide    Tablet 20 milliGRAM(s) Oral <User Schedule>  furosemide    Tablet 40 milliGRAM(s) Oral <User Schedule>  gabapentin 400 milliGRAM(s) Oral three times a day  lamoTRIgine 100 milliGRAM(s) Oral two times a day  methocarbamol 750 milliGRAM(s) Oral three times a day  metoprolol tartrate 25 milliGRAM(s) Oral two times a day  oxyCODONE    5 mG/acetaminophen 325 mG 1 Tablet(s) Oral every 6 hours  pantoprazole    Tablet 40 milliGRAM(s) Oral before breakfast  QUEtiapine 25 milliGRAM(s) Oral two times a day  regadenoson Injectable 0.4 milliGRAM(s) IV Push once  senna 2 Tablet(s) Oral at bedtime    PRN MEDICATIONS  ipratropium    for Nebulization 500 MICROGram(s) Nebulizer every 6 hours PRN  nitroglycerin     SubLingual 0.4 milliGRAM(s) SubLingual every 5 minutes PRN    VITALS:   T(F): 97.6  HR: 61  BP: 108/59  RR: 18  SpO2: 96%    LABS:                        RADIOLOGY:    PHYSICAL EXAM:  GEN: No acute distress  LUNGS: Clear to auscultation bilaterally   HEART: S1/S2 present. RRR.   ABD: Soft, non-tender, non-distended. Bowel sounds present  EXT: NC/NC/NE/2+PP/YARBROUGH  NEURO: AAOX3

## 2023-09-08 NOTE — PROGRESS NOTES
[facility] at [facility] [address] [place], [address] [facility]  Ph [phone]  Fx [phone]    Plan of Care/ Statement of Necessity for Physical Therapy Services    Patient name: Helga Wong Start of Care: 2023   Referral source: Olaf Skelton : 1985    Medical Diagnosis: Other symptoms and signs involving the genitourinary system [R39.89]   Onset Date:23    Treatment Diagnosis: Other symptoms and signs involving the genitourinary system [R39.89]   Prior Hospitalization: see medical history Provider#: [id number]   Medications: Verified on Patient summary List    Comorbidities: Pelvic PT while pregnant with 4th with prolapse  PMHx/Surgical Hx: 5 kids (1 c section, remaining 4 vbacs stitching with 2nd and 3rd), cholecystectomy , hx of pelvic pain/ dysfunction, LBP   Prior Level of Function: Prior to kids no limitations, more issues after 3rd child          The Plan of Care and following information is based on the information from the initial evaluation. Assessment/ key information: Patient is a 43year old female presenting to Physical Therapy with c/o stress urinary incontinence, LBP, pelvic pressure/ prolapse, and SIJ pain which is limiting ability function at previous level. She had her 5th baby in 2023, and noticed heaviness and symptoms of prolapse after that (although these are improving), but has had pain for some time. Patient has LBP/ SIJ pain complaints with stair navigation, lifting, carrying items, laying in bed, and transfers. Patient presents with decreased strength, coordination, and endurance of the postural stabilizers, poor body mechanics, poor posture, and limited lumbar ROM. Pt defers internal assessment today. Patient presents with poor understanding of bladder and bowel anatomy/function, dietary irritants, and urge suppression. I feel patient would benefit from skilled therapeutic intervention to optimize highest functional level possible.

## 2023-09-22 ENCOUNTER — APPOINTMENT (OUTPATIENT)
Facility: HOSPITAL | Age: 38
End: 2023-09-22
Payer: MEDICAID

## 2023-09-29 ENCOUNTER — HOSPITAL ENCOUNTER (OUTPATIENT)
Facility: HOSPITAL | Age: 38
Setting detail: RECURRING SERIES
End: 2023-09-29
Payer: MEDICAID

## 2023-09-29 ENCOUNTER — TELEPHONE (OUTPATIENT)
Facility: HOSPITAL | Age: 38
End: 2023-09-29

## 2023-09-29 NOTE — TELEPHONE ENCOUNTER
Patient no showed. Called and spoke with patient; pt informing therapist that her daughter's state testing went long and that she was going to be late. I discussed options with patient (short visit or wait till next week); pt prefers to wait till her next appt.

## 2023-10-06 ENCOUNTER — HOSPITAL ENCOUNTER (OUTPATIENT)
Facility: HOSPITAL | Age: 38
Setting detail: RECURRING SERIES
Discharge: HOME | End: 2023-10-09
Payer: MEDICAID

## 2023-10-06 PROCEDURE — 97530 THERAPEUTIC ACTIVITIES: CPT

## 2023-10-06 PROCEDURE — 97112 NEUROMUSCULAR REEDUCATION: CPT

## 2023-10-06 PROCEDURE — 97535 SELF CARE MNGMENT TRAINING: CPT

## 2023-10-06 NOTE — PROGRESS NOTES
PHYSICAL / OCCUPATIONAL THERAPY - DAILY TREATMENT NOTE (updated )    Patient Name: Ludwin Cabrera    Date: 10/6/2023    : 1985  Insurance: Payor: Mague Ardon / Plan: Mague Ardon / Product Type: *No Product type* /      Patient  verified Yes     Visit #   Current / Total 2 12   Time   In / Out 9:18 AM 9:50 AM   Pain   In / Out 5 0   Subjective Functional Status/Changes: \"I just want to get my prolapse better. \"   Changes to: Allergies, Med Hx, Sx Hx?   no       TREATMENT AREA =  Other symptoms and signs involving the genitourinary system [R39.89]    OBJECTIVE      Therapeutic Procedures: Tx Min Billable or 1:1 Min (if diff from Tx Min) Procedure, Rationale, Specifics   15  [id number] Neuromuscular Re-Education (timed):  improve balance, coordination, kinesthetic sense, posture, core stability and proprioception to improve patient's ability to develop conscious control of individual muscles and awareness of position of extremities in order to progress to PLOF and address remaining functional goals. (see flow sheet as applicable)    Details if applicable:    Patient consents to internal assessment. See assessment for detail. PERF: 3/3/10//10   9  84270 Self Care/Home Management (timed):  improve patient knowledge and understanding of posture/ergonomics, home safety, activity modification, and physical therapy expectations, procedures and progression  to improve patient's ability to progress to PLOF and address remaining functional goals. (see flow sheet as applicable)    Details if applicable:     8  [id number] Therapeutic Activity (timed):  use of dynamic activities replicating functional movements to increase ROM, strength, coordination, balance, and proprioception in order to improve patient's ability to progress to PLOF and address remaining functional goals.   (see flow sheet as applicable)     Details if applicable:    -discussion regarding posture with ADLs         Details if applicable:

## 2023-10-06 NOTE — PROGRESS NOTES
[facility] at [address]  [address], [address] [facility]  Ph [phone]  Fx [phone]    Physical Therapy Progress Note  Patient name: Chris Ovalles Start of Care: 2023   Referral source: Michelle Yost : 1985               Medical Diagnosis: Other symptoms and signs involving the genitourinary system [R39.89]    Onset Date:23               Treatment Diagnosis: Other symptoms and signs involving the genitourinary system [R39.89]   Prior Hospitalization: see medical history Provider#: [id number]   Medications: Verified on Patient summary List    Comorbidities: Pelvic PT while pregnant with 4th with prolapse  PMHx/Surgical Hx: 5 kids (1 c section, remaining 4 vbacs stitching with 2nd and 3rd), cholecystectomy , hx of pelvic pain/ dysfunction, LBP   Prior Level of Function: Prior to kids no limitations, more issues after 3rd child    Visits from Start of Care: 2    Missed Visits: 1    Updated Goals/Measure of Progress: To be achieved in 10 treatments:  Short Term Goals: To be accomplished in 6 treatments       Pt will be independent and compliant with HEP to maximize benefits of PT. Eval: Will distribute next visit  Current: pt has been doing exercises at home from previous round of pelvic floor PT in [address] [place] [address] 10/6/23     Pt will report 50% improvement in pain and impairments to improve quality of life. Eval: 0% improvement  Current: 0% improvement no change 10/6/23     Pt will have 25% improvement in lumbar ROM without pain to improve ability to complete ADLs. Eval:  ROM % LOSS AROM Comments:pain, area   Forward flexion 40-60 50     Extension 20-30 90 P! SideBend right 20-30 50 P! SideBend left 20-30 50     Sideglide right 50     Sideglide left  50 P! Current: 10/6/23  ROM % LOSS AROM Comments:pain, area   Forward flexion 40-60 WNL LBP    Extension 20-30 90 P! SideBend right 20-30 50 P!    SideBend left 20-30 50     Sideglide right 50

## 2023-10-13 ENCOUNTER — HOSPITAL ENCOUNTER (OUTPATIENT)
Facility: HOSPITAL | Age: 38
Setting detail: RECURRING SERIES
Discharge: HOME | End: 2023-10-16
Payer: MEDICAID

## 2023-10-13 ENCOUNTER — TELEPHONE (OUTPATIENT)
Facility: HOSPITAL | Age: 38
End: 2023-10-13

## 2023-10-13 PROCEDURE — 97535 SELF CARE MNGMENT TRAINING: CPT

## 2023-10-13 PROCEDURE — 97112 NEUROMUSCULAR REEDUCATION: CPT

## 2023-10-13 PROCEDURE — 97530 THERAPEUTIC ACTIVITIES: CPT

## 2023-10-13 PROCEDURE — 97110 THERAPEUTIC EXERCISES: CPT

## 2023-10-13 NOTE — PROGRESS NOTES
PHYSICAL / OCCUPATIONAL THERAPY - DAILY TREATMENT NOTE (updated )    Patient Name: Nuria Book    Date: 10/13/2023    : 1985  Insurance: Payor: Sobeida Umanzor / Plan: Sobeida Umanzor / Product Type: *No Product type* /      Patient  verified Yes     Visit #   Current / Total 3 12   Time   In / Out 1032 1114   Pain   In / Out 5 5   Subjective Functional Status/Changes: Pt reports always having constipation except when she has been physically active   Changes to: Allergies, Med Hx, Sx Hx?   no       TREATMENT AREA =  Other symptoms and signs involving the genitourinary system [R39.89]    OBJECTIVE          Therapeutic Procedures: Tx Min Billable or 1:1 Min (if diff from Tx Min) Procedure, Rationale, Specifics   16 16 [id number] Therapeutic Exercise (timed):  increase ROM, strength, coordination, balance, and proprioception to improve patient's ability to progress to PLOF and address remaining functional goals. (see flow sheet as applicable)    Details if applicable:       10 10 37580 Neuromuscular Re-Education (timed):  improve balance, coordination, kinesthetic sense, posture, core stability and proprioception to improve patient's ability to develop conscious control of individual muscles and awareness of position of extremities in order to progress to PLOF and address remaining functional goals. (see flow sheet as applicable)    Details if applicable:     8 8 11786 Therapeutic Activity (timed):  use of dynamic activities replicating functional movements to increase ROM, strength, coordination, balance, and proprioception in order to improve patient's ability to progress to PLOF and address remaining functional goals. (see flow sheet as applicable)     Details if applicable:     8 8 [id number] Self Care/Home Management (timed):  improve patient knowledge and understanding of activity modification  to improve patient's ability to progress to PLOF and address remaining functional goals.   (see flow sheet as

## 2023-10-20 ENCOUNTER — APPOINTMENT (OUTPATIENT)
Facility: HOSPITAL | Age: 38
End: 2023-10-20
Payer: MEDICAID

## 2023-10-27 ENCOUNTER — HOSPITAL ENCOUNTER (OUTPATIENT)
Facility: HOSPITAL | Age: 38
Setting detail: RECURRING SERIES
Discharge: HOME | End: 2023-10-30
Payer: MEDICAID

## 2023-10-27 PROCEDURE — 97112 NEUROMUSCULAR REEDUCATION: CPT

## 2023-10-27 PROCEDURE — 97110 THERAPEUTIC EXERCISES: CPT

## 2023-10-27 PROCEDURE — 97530 THERAPEUTIC ACTIVITIES: CPT

## 2023-10-27 PROCEDURE — 97535 SELF CARE MNGMENT TRAINING: CPT

## 2023-10-27 NOTE — PROGRESS NOTES
PHYSICAL / OCCUPATIONAL THERAPY - DAILY TREATMENT NOTE (updated )    Patient Name: Leopold Angel    Date: 10/27/2023    : 1985  Insurance: Payor: Hany Haver / Plan: Hany Haver / Product Type: *No Product type* /      Patient  verified Yes     Visit #   Current / Total 4 12   Time   In / Out 10:34 AM 11:10 AM   Pain   In / Out 6-7/10 0   Subjective Functional Status/Changes: \"I started PT for my feet. They did graston on me and it really hurt, all the way up to my hips. \" (Patient is attending another PT facility for feet.)   Changes to: Allergies, Med Hx, Sx Hx?   no       TREATMENT AREA =  Other symptoms and signs involving the genitourinary system [R39.89]    OBJECTIVE      Therapeutic Procedures: Tx Min Billable or 1:1 Min (if diff from Tx Min) Procedure, Rationale, Specifics   9  41694 Therapeutic Exercise (timed):  increase ROM, strength, coordination, balance, and proprioception to improve patient's ability to progress to PLOF and address remaining functional goals. (see flow sheet as applicable)    Details if applicable:    -provided pt with [facility]   9  47247 Neuromuscular Re-Education (timed):  improve balance, coordination, kinesthetic sense, posture, core stability and proprioception to improve patient's ability to develop conscious control of individual muscles and awareness of position of extremities in order to progress to PLOF and address remaining functional goals.  (see flow sheet as applicable)    Details if applicable:    Position, initial resting tone Work/Rest/Reps Comments   Supine   10/10/10   -see assessment     Sitting   10/10/10  2/4/10 -see assessment             9  26552 Self Care/Home Management (timed):  improve patient knowledge and understanding of positioning, posture/ergonomics, home safety, activity modification, diagnosis/prognosis, and physical therapy expectations, procedures and progression  to improve patient's ability to progress to PLOF and address

## 2023-11-01 ENCOUNTER — TELEPHONE (OUTPATIENT)
Facility: HOSPITAL | Age: 38
End: 2023-11-01

## 2023-11-03 ENCOUNTER — APPOINTMENT (OUTPATIENT)
Facility: HOSPITAL | Age: 38
End: 2023-11-03
Payer: MEDICAID

## 2023-11-10 ENCOUNTER — HOSPITAL ENCOUNTER (OUTPATIENT)
Facility: HOSPITAL | Age: 38
Setting detail: RECURRING SERIES
Discharge: HOME | End: 2023-11-13
Payer: MEDICAID

## 2023-11-10 PROCEDURE — 97530 THERAPEUTIC ACTIVITIES: CPT

## 2023-11-10 NOTE — PROGRESS NOTES
PHYSICAL / OCCUPATIONAL THERAPY - DAILY TREATMENT NOTE (updated )    Patient Name: Adrienne Mann    Date: 11/10/2023    : 1985  Insurance: Payor: Emily Garcia / Plan: Emily Garcia / Product Type: *No Product type* /      Patient  verified Yes     Visit #   Current / Total 5 12   Time   In / Out 1041 1110   Pain   In / Out 5-6 5   Subjective Functional Status/Changes: Pt reports using breathing sequence when lifting her children   Changes to: Allergies, Med Hx, Sx Hx?   no       TREATMENT AREA =  Other symptoms and signs involving the genitourinary system [R39.89]    OBJECTIVE      Therapeutic Procedures: Tx Min Billable or 1:1 Min (if diff from Tx Min) Procedure, Rationale, Specifics   29 29 51730 Therapeutic Activity (timed):  use of dynamic activities replicating functional movements to increase ROM, strength, coordination, balance, and proprioception in order to improve patient's ability to progress to PLOF and address remaining functional goals. (see flow sheet as applicable)    Details if applicable:             Details if applicable:            Details if applicable:           Details if applicable:            Details if applicable:     34 27 [facility] Totals Reminder: bill using total billable min of TIMED therapeutic procedures (example: do not include dry needle or estim unattended, both untimed codes, in totals to left)  8-22 min = 1 unit; 23-37 min = 2 units; 38-52 min = 3 units; 53-67 min = 4 units; 68-82 min = 5 units   Total Total     TOTAL TREATMENT TIME:        29     [x]  Patient Education billed concurrently with other procedures   [x] Review HEP    [] Progressed/Changed HEP, detail:    [] Other detail:       Objective Information/Functional Measures/Assessment  Patient is a 43year old female who has attended 5 physical therapy appointments for stress urinary incontinence, LBP, pelvic pressure/ prolapse, and SIJ pain. Patient reports a 20% decrease in pain and impairments.

## 2023-11-10 NOTE — PROGRESS NOTES
[facility] at [address] Adam Butterfield, [address] [facility]  Ph [phone]  Fx [phone]    Physical Therapy Progress Note  Patient name: Brittney Devlin Start of Care: 2023   Referral source: Lalo Raphael : 1985               Medical Diagnosis: Other symptoms and signs involving the genitourinary system [R39.89]    Onset Date:23               Treatment Diagnosis: Other symptoms and signs involving the genitourinary system [R39.89]   Prior Hospitalization: see medical history Provider#: [id number]   Medications: Verified on Patient summary List    Comorbidities: Pelvic PT while pregnant with 4th with prolapse  PMHx/Surgical Hx: 5 kids (1 c section, remaining 4 vbacs stitching with 2nd and 3rd), cholecystectomy , hx of pelvic pain/ dysfunction, LBP   Prior Level of Function: Prior to kids no limitations, more issues after 3rd child      Visits from Start of Care: 5    Missed Visits: 1    Updated Goals/Measure of Progress: To be achieved in 7 treatments:    Short Term Goals: To be accomplished in 6 treatments       Pt will be independent and compliant with HEP to maximize benefits of PT. Eval: Will distribute next visit  Current: pt has been doing exercises at home from previous round of pelvic floor PT in [address] [place] [address] 10/6/23  Current: new HEP provided PROGRESSING 10/27/23  PN:  pt reports working on breathing while lifting her children but has not done the new HEP exercises. 11/10/2023     Pt will report 50% improvement in pain and impairments to improve quality of life. Eval: 0% improvement  Current: 0% improvement no change 10/6/23  PN:  Pt reports a 20% improvement in pain. Patient reports becoming more active and is taking magnesium. Progressing 111/10/2023     Pt will have 25% improvement in lumbar ROM without pain to improve ability to complete ADLs. Eval:  ROM % LOSS AROM Comments:pain, area   Forward flexion 40-60 50     Extension 20-30 90 P!

## 2023-11-17 ENCOUNTER — HOSPITAL ENCOUNTER (OUTPATIENT)
Facility: HOSPITAL | Age: 38
Setting detail: RECURRING SERIES
Discharge: HOME | End: 2023-11-20
Payer: MEDICAID

## 2023-11-17 PROCEDURE — 97530 THERAPEUTIC ACTIVITIES: CPT

## 2023-11-17 PROCEDURE — 97110 THERAPEUTIC EXERCISES: CPT

## 2023-11-17 PROCEDURE — 97112 NEUROMUSCULAR REEDUCATION: CPT

## 2023-11-17 NOTE — PROGRESS NOTES
Dorsiflexion (L4) 5 5 []    Great Toe Extension (L5) 5 5 []    Ankle Plantarflexion (S1) 4 4 []    Knee Flexion (S1,2) 5 5 []    Hip IR 5 5 []    Hip ER 5 5 []     Current: 10/6/23    L(0-5) R (0-5) N/T   Hip Flexion (L1,2) 4 4 []    Knee Extension (L3,4) 4- 4 []    Ankle Dorsiflexion (L4) 4 4 []    Great Toe Extension (L5) 4 4 []    Ankle Plantarflexion (S1) 4 4 []    Knee Flexion (S1,2) 4 4 []    Hip IR 3+ 3+ []    Hip ER 3+ 3+ []        PN: 11/10/2023 Progressing    L(0-5) R (0-5) N/T   Hip Flexion (L1,2) 4 4  []    Knee Extension (L3,4) 5 5 []    Ankle Dorsiflexion (L4) 5 5 []    Great Toe Extension (L5) 5 5 []    Ankle Plantarflexion (S1) 5 5 []    Knee Flexion (S1,2) 5 5 []    Hip IR 5 5 []    Hip ER 5 5 []          Pt demonstrates improvement of current complaints evidenced by an  improvement in FOTO score to indicate \"no difficulty\" while doing household chores. Eval: FOTO 21/ 46. Moderate difficulty  Current: will assess at 5th visit  PN: \"a little bit\"  Progressing 11/10/2023  FOTO score=established functional score where 100=no disability     Pt demonstrates independence with management tools & exercise program that are beneficial for current condition in order to feel comfortable with Pelvic floor PT D/C & not fear. Eval: pt fearful of return to exercise & unaware of what activities to avoid to avoid exacerbation of current condition  Current: today is only second visit 10/6/23  PN:  pt reports trying to use breathing when lifting to manage pressure while lifting her children.   Progressing 11/10/2023  Current: carryover of pressure management improving  11/17/23         PLAN  Yes  Continue plan of care  []  Upgrade activities as tolerated  []  Discharge due to :  []  Other:    Yosef Casarez PTA    11/17/2023    9:07 AM    Future Appointments   Date Time Provider [address] [place] [address]   11/17/2023 10:30 AM Yosef Casarez PTA [facility]   12/1/2023 10:30 AM Yosef Casarez PTA [facility]   12/8/2023

## 2023-12-01 ENCOUNTER — HOSPITAL ENCOUNTER (OUTPATIENT)
Facility: HOSPITAL | Age: 38
Setting detail: RECURRING SERIES
Discharge: HOME | End: 2023-12-04
Payer: MEDICAID

## 2023-12-01 PROCEDURE — 97110 THERAPEUTIC EXERCISES: CPT

## 2023-12-01 PROCEDURE — 97535 SELF CARE MNGMENT TRAINING: CPT

## 2023-12-01 PROCEDURE — 97112 NEUROMUSCULAR REEDUCATION: CPT

## 2023-12-01 NOTE — PROGRESS NOTES
PHYSICAL / OCCUPATIONAL THERAPY - DAILY TREATMENT NOTE (updated )    Patient Name: Mag Lucero    Date: 2023    : 1985  Insurance: Payor: Randy Oliveira / Plan: Randy Oliveira / Product Type: *No Product type* /      Patient  verified Yes     Visit #   Current / Total 7 12   Time   In / Out 10:42 AM 11:12 AM   Pain   In / Out 4 4   Subjective Functional Status/Changes: Patient reports having intercourse for the first time since having baby. Patient reports some pain and \"burning\" sensation with intercourse; condom was used but no lubrication. Patient is currently nursing. Changes to: Allergies, Med Hx, Sx Hx?   no       TREATMENT AREA =  Other symptoms and signs involving the genitourinary system [R39.89]    OBJECTIVE    Therapeutic Procedures: Tx Min Billable or 1:1 Min (if diff from Tx Min) Procedure, Rationale, Specifics   10  52950 Therapeutic Exercise (timed):  increase ROM, strength, coordination, balance, and proprioception to improve patient's ability to progress to PLOF and address remaining functional goals. (see flow sheet as applicable)    Details if applicable:       10  82444 Neuromuscular Re-Education (timed):  improve balance, coordination, kinesthetic sense, posture, core stability and proprioception to improve patient's ability to develop conscious control of individual muscles and awareness of position of extremities in order to progress to PLOF and address remaining functional goals. (see flow sheet as applicable)    Details if applicable:     10  19142 Self Care/Home Management (timed):  improve patient knowledge and understanding of pain reducing techniques, positioning, posture/ergonomics, activity modification, and physical therapy expectations, procedures and progression  to improve patient's ability to progress to PLOF and address remaining functional goals.   (see flow sheet as applicable)     Details if applicable:           Details if applicable:

## 2023-12-08 ENCOUNTER — HOSPITAL ENCOUNTER (OUTPATIENT)
Facility: HOSPITAL | Age: 38
Setting detail: RECURRING SERIES
Discharge: HOME | End: 2023-12-11
Payer: MEDICAID

## 2023-12-08 PROCEDURE — 97110 THERAPEUTIC EXERCISES: CPT

## 2023-12-08 PROCEDURE — 97112 NEUROMUSCULAR REEDUCATION: CPT

## 2023-12-08 PROCEDURE — 97530 THERAPEUTIC ACTIVITIES: CPT

## 2023-12-08 NOTE — PROGRESS NOTES
PHYSICAL / OCCUPATIONAL THERAPY - DAILY TREATMENT NOTE (updated )    Patient Name: Alee Chua    Date: 2023    : 1985  Insurance: Payor: Brandi Cunningham / Plan: Brandi Cunningham / Product Type: *No Product type* /      Patient  verified Yes     Visit #   Current / Total 8 12   Time   In / Out 1035 1115   Pain   In / Out 7 LBP 7 LBP   Subjective Functional Status/Changes: Pt reports ordering a new bed   Changes to: Allergies, Med Hx, Sx Hx?   no       TREATMENT AREA =  Other symptoms and signs involving the genitourinary system [R39.89]    OBJECTIVE        Therapeutic Procedures: Tx Min Billable or 1:1 Min (if diff from Tx Min) Procedure, Rationale, Specifics   10 10 36566 Therapeutic Exercise (timed):  increase ROM, strength, coordination, balance, and proprioception to improve patient's ability to progress to PLOF and address remaining functional goals. (see flow sheet as applicable)    Details if applicable:        11034 Therapeutic Activity (timed):  use of dynamic activities replicating functional movements to increase ROM, strength, coordination, balance, and proprioception in order to improve patient's ability to progress to PLOF and address remaining functional goals. (see flow sheet as applicable)    Details if applicable:     8  56050 Self Care/Home Management (timed):  improve patient knowledge and understanding of pain reducing techniques  to improve patient's ability to progress to PLOF and address remaining functional goals.   (see flow sheet as applicable)     Details if applicable:           Details if applicable:            Details if applicable:     40 36 [address] [facility] Reminder: bill using total billable min of TIMED therapeutic procedures (example: do not include dry needle or estim unattended, both untimed codes, in totals to left)  8-22 min = 1 unit; 23-37 min = 2 units; 38-52 min = 3 units; 53-67 min = 4 units; 68-82 min = 5 units   Total Total     TOTAL TREATMENT

## 2023-12-08 NOTE — PROGRESS NOTES
[facility] at [address] Drew Hutson, [address] [facility]  Ph [phone]  Fx [phone]    Physical Therapy Progress Note  Patient name: Juve Speras Start of Care: 2023   Referral source: Shannon Hawkins : 1985               Medical Diagnosis: Other symptoms and signs involving the genitourinary system [R39.89]    Onset Date:23               Treatment Diagnosis: Other symptoms and signs involving the genitourinary system [R39.89]   Prior Hospitalization: see medical history Provider#: [id number]   Medications: Verified on Patient summary List    Comorbidities: Pelvic PT while pregnant with 4th with prolapse  PMHx/Surgical Hx: 5 kids (1 c section, remaining 4 vbacs stitching with 2nd and 3rd), cholecystectomy , hx of pelvic pain/ dysfunction, LBP   Prior Level of Function: Prior to kids no limitations, more issues after 3rd child    Visits from Start of Care: 8    Missed Visits: 0    Updated Goals/Measure of Progress: To be achieved in 4 treatments:    Short Term Goals: To be accomplished in 6 treatments       Pt will be independent and compliant with HEP to maximize benefits of PT. Eval: Will distribute next visit  Current: pt has been doing exercises at home from previous round of pelvic floor PT in [address] [place] [address] 10/6/23  Current: new HEP provided PROGRESSING 10/27/23  PN:  pt reports working on breathing while lifting her children but has not done the new HEP exercises. 11/10/2023  PN:  pt reports using new HEP  2023 Progressing     Pt will report 50% improvement in pain and impairments to improve quality of life. Eval: 0% improvement  Current: 0% improvement no change 10/6/23  PN:  Pt reports a 20% improvement in pain. Patient reports becoming more active and is taking magnesium. Progressing 11/10/2023  PN: improvement of  pelvic pain by  50% but continues to have LBP.   Progressing 2023     Pt will have 25% improvement in lumbar ROM without

## 2023-12-22 ENCOUNTER — HOSPITAL ENCOUNTER (OUTPATIENT)
Facility: HOSPITAL | Age: 38
Setting detail: RECURRING SERIES
End: 2023-12-22
Payer: MEDICAID

## 2023-12-29 ENCOUNTER — HOSPITAL ENCOUNTER (OUTPATIENT)
Facility: HOSPITAL | Age: 38
Setting detail: RECURRING SERIES
End: 2023-12-29
Payer: MEDICAID